# Patient Record
(demographics unavailable — no encounter records)

---

## 2024-10-11 NOTE — ASSESSMENT
[FreeTextEntry1] : Reviewed and reconciled medications, allergies, PMHx, PSHx, SocHx, FMHx.  Pt. with h/o hearing loss, recurrent ear infections, snoring at night with apneic episodes, headaches, right-sided epistaxis, and s/p bilateral tympanostomy with insertion of tubes, tonsillectomy, and adenoidectomy and bilateral electro submucosal resection of the inferior turbinates on 09/17/24 presents today with Mom for a follow. Mom states that she is doing well. Mom states that she felt something in her left ear this morning. Mom states that she has been using saline nasal spray, antibiotics, and Mupirocin.  Pathology 09/17/24: -all benign  Audio 9/27/24: -LECV AD -Type A Tymp AS -AD: Slight CHL @ 250 Hz to -8000 Hz -AS: Hearing -8000 Hz -left TPP: -56   Physical exam: -left ear canal: blood, crusting, and scabbing removed via curettage -tube is in perfect position in the TM bilaterally -scabbing and crusting intranasally on the right   Plan: -Start Ciprodex ear drops - 4 drops TID in the left ear -Keep the ears dry -FU in 10 days

## 2024-10-11 NOTE — CONSULT LETTER
[Dear  ___] : Dear  [unfilled], [Courtesy Letter:] : I had the pleasure of seeing your patient, [unfilled], in my office today. [Please see my note below.] : Please see my note below. [Consult Closing:] : Thank you very much for allowing me to participate in the care of this patient.  If you have any questions, please do not hesitate to contact me. [Sincerely,] : Sincerely, [FreeTextEntry3] :  Stiven Redmond MD FACS

## 2024-10-11 NOTE — DATA REVIEWED
[FreeTextEntry1] : Pathology 09/17/24: -all benign  Audio 9/27/24: -LECV AD -Type A Tymp AS -AD: Slight CHL @ 250 Hz to -8000 Hz -AS: Hearing -8000 Hz -left TPP: -56

## 2024-10-11 NOTE — HISTORY OF PRESENT ILLNESS
[de-identified] : Pt. with h/o hearing loss, recurrent ear infections, snoring at night with apneic episodes, headaches, right-sided epistaxis, and s/p bilateral tympanostomy with insertion of tubes, tonsillectomy, and adenoidectomy and bilateral electro submucosal resection of the inferior turbinates on 09/17/24 presents today with Mom for a follow. Mom states that she is doing well. Mom states that she felt something in her left ear this morning. Mom states that she has been using saline nasal spray, antibiotics, and Mupirocin.

## 2024-10-11 NOTE — ADDENDUM
[FreeTextEntry1] :  Documented by Ramirez Adrian acting as scribe for Dr. Redmond on 10/11/2024. All Medical record entries made by the Scribe were at my, Dr. Redmond, direction and personally dictated by me on 10/11/2024 . I have reviewed the chart and agree that the record accurately reflects my personal performance of the history, physical exam, assessment and plan. I have also personally directed, reviewed, and agreed with the discharge instructions.

## 2024-10-11 NOTE — PHYSICAL EXAM
[Normal] : the left nasal cavity was normal [FreeTextEntry9] : blood, crusting, and scabbing removed via curettage [de-identified] : tube is in perfect position [de-identified] : scabbing and crusting intranasally [de-identified] : tube is in perfect position

## 2024-10-21 NOTE — HISTORY OF PRESENT ILLNESS
[de-identified] : Pt. with h/o hearing loss, recurrent ear infections, snoring at night with apneic episodes, headaches, and right-sided epistaxis, presents today with Mom s/p bilateral tympanostomy with insertion of tubes, tonsillectomy, and adenoidectomy and bilateral electro submucosal resection of the inferior turbinates on 09/17/24. Presents as a follow up for scabbing in the right side of the nose and blood in the ear.  Patient mother notes she hasnt noticed any bleeding in the nose or ears since last visit.

## 2024-10-21 NOTE — ASSESSMENT
[FreeTextEntry1] : Reviewed and reconciled medications, allergies, PMHx, PSHx, SocHx, FMHx.     Physical exam: -left ear canal: hard dry blood, crusting, and scabbing. Suctioned some out but to hard to remove.  -tube is in perfect position in the TM bilaterally -scabbing and crusting intranasally on the right  Plan: -Start olfoxacin ear drops - 4 drops TID in the left ear for next month -saline spray in the nose -FU in 30 days.

## 2024-10-21 NOTE — ADDENDUM
[FreeTextEntry1] :  Documented by Ramirez Adrian acting as scribe for Dr. Redmond on 10/21/2024. All Medical record entries made by the Scribe were at my, Dr. Redmond, direction and personally dictated by me on 10/21/2024 . I have reviewed the chart and agree that the record accurately reflects my personal performance of the history, physical exam, assessment and plan. I have also personally directed, reviewed, and agreed with the discharge instructions.